# Patient Record
Sex: MALE | Race: WHITE
[De-identification: names, ages, dates, MRNs, and addresses within clinical notes are randomized per-mention and may not be internally consistent; named-entity substitution may affect disease eponyms.]

---

## 2022-06-23 ENCOUNTER — HOSPITAL ENCOUNTER (EMERGENCY)
Dept: HOSPITAL 33 - ED | Age: 37
Discharge: HOME | End: 2022-06-23
Payer: COMMERCIAL

## 2022-06-23 VITALS — SYSTOLIC BLOOD PRESSURE: 149 MMHG | HEART RATE: 88 BPM | DIASTOLIC BLOOD PRESSURE: 104 MMHG

## 2022-06-23 DIAGNOSIS — N49.2: Primary | ICD-10-CM

## 2022-06-23 DIAGNOSIS — Z28.310: ICD-10-CM

## 2022-06-23 DIAGNOSIS — N50.82: ICD-10-CM

## 2022-06-23 DIAGNOSIS — Z79.891: ICD-10-CM

## 2022-06-23 LAB
ALBUMIN SERPL-MCNC: 4.2 G/DL (ref 3.5–5)
ALP SERPL-CCNC: 84 U/L (ref 38–126)
ALT SERPL-CCNC: 102 U/L (ref 0–50)
ANION GAP SERPL CALC-SCNC: 14.1 MEQ/L (ref 5–15)
AST SERPL QL: 45 U/L (ref 17–59)
BASOPHILS # BLD AUTO: 0.02 X10^3/UL (ref 0–0.4)
BILIRUB BLD-MCNC: 1.3 MG/DL (ref 0.2–1.3)
BUN SERPL-MCNC: 14 MG/DL (ref 9–20)
CALCIUM SPEC-MCNC: 9.4 MG/DL (ref 8.4–10.2)
CHLORIDE SERPL-SCNC: 104 MMOL/L (ref 98–107)
CO2 SERPL-SCNC: 24 MMOL/L (ref 22–30)
CREAT SERPL-MCNC: 1 MG/DL (ref 0.66–1.25)
EOSINOPHIL # BLD AUTO: 0.05 X10^3/UL (ref 0–0.5)
GFR SERPLBLD BASED ON 1.73 SQ M-ARVRAT: > 60 ML/MIN
GLUCOSE SERPL-MCNC: 105 MG/DL (ref 74–106)
HCT VFR BLD AUTO: 49.5 % (ref 42–50)
HGB BLD-MCNC: 17.1 G/DL (ref 12.5–18)
LYMPHOCYTES # SPEC AUTO: 0.66 X10^3/UL (ref 1–4.6)
MCH RBC QN AUTO: 31.5 PG (ref 26–32)
MCHC RBC AUTO-ENTMCNC: 34.5 G/DL (ref 32–36)
MONOCYTES # BLD AUTO: 0.7 X10^3/UL (ref 0–1.3)
PLATELET # BLD AUTO: 190 X10^3/UL (ref 150–450)
POTASSIUM SERPLBLD-SCNC: 4 MMOL/L (ref 3.5–5.1)
PROT SERPL-MCNC: 7.1 G/DL (ref 6.3–8.2)
RBC # BLD AUTO: 5.42 X10^6/UL (ref 4.1–5.6)
SODIUM SERPL-SCNC: 139 MMOL/L (ref 137–145)
WBC # BLD AUTO: 10.9 X10^3/UL (ref 4–10.5)

## 2022-06-23 PROCEDURE — 96375 TX/PRO/DX INJ NEW DRUG ADDON: CPT

## 2022-06-23 PROCEDURE — 83605 ASSAY OF LACTIC ACID: CPT

## 2022-06-23 PROCEDURE — 96374 THER/PROPH/DIAG INJ IV PUSH: CPT

## 2022-06-23 PROCEDURE — 72192 CT PELVIS W/O DYE: CPT

## 2022-06-23 PROCEDURE — 36415 COLL VENOUS BLD VENIPUNCTURE: CPT

## 2022-06-23 PROCEDURE — 80053 COMPREHEN METABOLIC PANEL: CPT

## 2022-06-23 PROCEDURE — 85025 COMPLETE CBC W/AUTO DIFF WBC: CPT

## 2022-06-23 PROCEDURE — 87070 CULTURE OTHR SPECIMN AEROBIC: CPT

## 2022-06-23 PROCEDURE — 96365 THER/PROPH/DIAG IV INF INIT: CPT

## 2022-06-23 PROCEDURE — 99284 EMERGENCY DEPT VISIT MOD MDM: CPT

## 2022-06-23 NOTE — ERPHSYRPT
- History of Present Illness


Time Seen by Provider: 06/23/22 10:09


Source: patient


Exam Limitations: no limitations


Patient Subjective Stated Complaint: pt here for abscess to rectal area for 3 

days now, pt has hx of this and has to have them i/d


Triage Nursing Assessment: pt alert, resp easy, face mask in place, unable to 

set down, no drainage


Physician History: 





36 years old male with history of multiple scrotal abscesses in the past needing

I&D presented in the ER with 2 days of increasing pain and swelling at posterior

end of scrotum.  Patient reports sharp shooting/stabbing severe pain radiation 

to perirectal area.  No fever or chills reported.  Denies any testicular pain.








Timing/Duration: day(s) (2), gradual onset, worse


Activites at Onset: rest


Quality: sharpness, stabbing


Onset Location: scrotal


Severity of Pain-Max: severe


Severity of Pain-Current: severe


Modifying Factors: Worsens With: movement, palpation


Associated Symptoms: swelling


Prior abdominal problems: none


Sexual intercourse history: non-contributory


Allergies/Adverse Reactions: 








Sulfa (Sulfonamide Antibiotics) Allergy (Verified 06/23/22 10:17)


   





Hx Tetanus, Diphtheria Vaccination/Date Given: No


Hx Influenza Vaccination/Date Given: No


Hx Pneumococcal Vaccination/Date Given: No


Immunizations Up to Date: Yes





Travel Risk





- International Travel


Have you traveled outside of the country in past 3 weeks: No





- Coronavirus Screening


Are you exhibiting any of the following symptoms?: No





- Vaccine Status


Have you recieved a Covid-19 vaccination: No





- Past Surgical History


Past Surgical History: Yes


Other Surgical History: abscess





- Social History


Smoking Status: Never smoker


Exposure to second hand smoke: No


Drug Use: none


Patient Lives Alone: No





- Review of Systems


Constitutional: No Symptoms


Ears, Nose, & Throat: No Symptoms


Respiratory: No Symptoms


Cardiac: No Symptoms


Abdominal/Gastrointestinal: No Symptoms


Genitourinary Symptoms: No Frequency, No Urinary Retention


Musculoskeletal: No Symptoms


Skin: No Symptoms


Neurological: No Symptoms


Psychological: No Symptoms


Endocrine: No Symptoms


Hematologic/Lymphatic: No Symptoms


Immunological/Allergic: No Symptoms





- Nursing Vital Signs


Nursing Vital Signs: 


                               Initial Vital Signs











Temperature  98.8 F   06/23/22 10:06


 


Pulse Rate  98 H  06/23/22 10:06


 


Respiratory Rate  18   06/23/22 10:06


 


Blood Pressure  156/114   06/23/22 10:06


 


O2 Sat by Pulse Oximetry  98   06/23/22 10:06








                                   Pain Scale











Pain Intensity                 5

















- Physical Exam


General Appearance: no apparent distress, alert


Eye Exam: PERRL/EOMI


Ears, Nose, Throat Exam: normal ENT inspection


Neck Exam: normal inspection, full range of motion


Respiratory Exam: normal breath sounds, lungs clear


Cardiovascular Exam: regular rate/rhythm, normal heart sounds


Gastrointestinal/Abdomen Exam: soft, normal bowel sounds, No tenderness


Rectal Exam: deferred


Male Genital Exam: normal genitalia, scrotum tenderness (R), scrotum tenderness 

(L) (Posterior scrotum 2 x 3 cm closer to cranium area of induration, tenderness

with no fluctuation.  No perirectal area swelling), circumcised, No epididymal 

tenderness


Back Exam: normal inspection


Extremity Exam: normal inspection, normal range of motion


Neurologic Exam: alert, oriented x 3, cooperative


Skin Exam: normal color


**SpO2 Interpretation**: normal


SpO2: 95


O2 Delivery: Room Air


Ordered Tests: 


Medication Summary














Discontinued Medications














Generic Name Dose Route Start Last Admin





  Trade Name Jameson  PRN Reason Stop Dose Admin


 


Hydromorphone HCl  1 mg  06/23/22 10:21  06/23/22 10:55





  Hydromorphone 1 Mg/1ml Inj*** 1 Mg/Ml Syringe  IV  06/23/22 10:22  1 mg





  STAT ONE   Administration


 


Hydromorphone HCl  Confirm  06/23/22 10:54 





  Hydromorphone 1 Mg/1ml Inj*** 1 Mg/Ml Syringe  Administered  06/23/22 10:55 





  Dose  





  1 mg  





  .ROUTE  





  .STK-MED ONE  


 


Sodium Chloride  1,000 mls @ 999 mls/hr  06/23/22 10:21  06/23/22 12:25





  Sodium Chloride 0.9% 1000 Ml  IV  06/23/22 11:21  Infused





  .Q1H1M STA   Infusion


 


Sodium Chloride  Confirm  06/23/22 10:54 





  Sodium Chloride 0.9% 1000 Ml  Administered  06/23/22 10:55 





  Dose  





  1,000 mls @ ud  





  .ROUTE  





  .STK-MED ONE  


 


Clindamycin HCl/Dextrose  900 mg in 50 mls @ 100 mls/hr  06/23/22 12:24  

06/23/22 13:08





  Clindamycin-D5w 900 Mg/50 Ml***  IV  06/23/22 12:53  Infused





  STAT STA   Infusion


 


Clindamycin HCl/Dextrose  Confirm  06/23/22 12:26 





  Clindamycin-D5w 900 Mg/50 Ml***  Administered  06/23/22 12:27 





  Dose  





  900 mg in 50 mls @ ud  





  IV  





  .STK-MED ONE  


 


Ondansetron HCl  4 mg  06/23/22 10:21  06/23/22 10:55





  Ondansetron Hcl 4 Mg/2 Ml Vial  IV  06/23/22 10:22  4 mg





  STAT ONE   Administration


 


Ondansetron HCl  Confirm  06/23/22 10:54 





  Ondansetron Hcl 4 Mg/2 Ml Vial  Administered  06/23/22 10:55 





  Dose  





  4 mg  





  .ROUTE  





  .STK-MED ONE  











Lab/Rad Data: 


                           Laboratory Result Diagrams





                                 06/23/22 10:45 





                                 06/23/22 10:45 





                               Laboratory Results











  06/23/22 06/23/22 06/23/22 Range/Units





  10:45 10:45 10:21 


 


WBC   10.9 H   (4.0-10.5)  x10^3/uL


 


RBC   5.42   (4.1-5.6)  x10^6/uL


 


Hgb   17.1   (12.5-18.0)  g/dL


 


Hct   49.5   (42-50)  %


 


MCV   91.3   ()  fL


 


MCH   31.5   (26-32)  pg


 


MCHC   34.5   (32-36)  g/dL


 


RDW   12.1   (11.5-14.0)  %


 


Plt Count   190   (150-450)  x10^3/uL


 


MPV   11.0   (7.5-11.0)  fL


 


Gran %   86.7 H   (36.0-66.0)  %


 


Immature Gran % (Auto)   0.2   (0.00-0.4)  %


 


Nucleat RBC Rel Count   0.0   (0.00-0.1)  %


 


Eos # (Auto)   0.05   (0-0.5)  x10^3/uL


 


Immature Gran # (Auto)   0.02   (0.00-0.03)  x10^3u/L


 


Absolute Lymphs (auto)   0.66 L   (1.0-4.6)  x10^3/uL


 


Absolute Monos (auto)   0.70   (0.0-1.3)  x10^3/uL


 


Absolute Nucleated RBC   0.00   (0.00-0.01)  x10^3u/L


 


Lymphocytes %   6.0 L   (24.0-44.0)  %


 


Monocytes %   6.4   (0.0-12.0)  %


 


Eosinophils %   0.5   (0.00-5.0)  %


 


Basophils %   0.2   (0.0-0.4)  %


 


Absolute Granulocytes   9.47 H   (1.4-6.9)  x10^3/uL


 


Basophils #   0.02   (0-0.4)  x10^3/uL


 


Sodium  139    (137-145)  mmol/L


 


Potassium  4.0    (3.5-5.1)  mmol/L


 


Chloride  104    ()  mmol/L


 


Carbon Dioxide  24    (22-30)  mmol/L


 


Anion Gap  14.1    (5-15)  MEQ/L


 


BUN  14    (9-20)  mg/dL


 


Creatinine  1.00    (0.66-1.25)  mg/dL


 


Estimated GFR  > 60.0    ML/MIN


 


Glucose  105    ()  mg/dL


 


Lactic Acid    1.2  (0.4-2.0)  


 


Calcium  9.4    (8.4-10.2)  mg/dL


 


Total Bilirubin  1.30    (0.2-1.3)  mg/dL


 


AST  45    (17-59)  U/L


 


ALT  102 H    (0-50)  U/L


 


Alkaline Phosphatase  84    ()  U/L


 


Serum Total Protein  7.1    (6.3-8.2)  g/dL


 


Albumin  4.2    (3.5-5.0)  g/dL














- Progress


Progress: improved, pain not gone completely


Progress Note: 





06/23/22 12:55


Is given pain medication for symptomatic relief.  Has a white count of 10.9, 

grossly unremarkable chemistries.  No UTI.  Obtain CT pelvis which showed 

posterior scrotal swelling but no definitive abscess or any signs symptoms 

suggesting Ayesha's gangrene.  Given clindamycin in ER.  I have tried to have 

him follow-up with his primary care but office is closed tomorrow.  Area is 

encircled and recommended ER follow-up tomorrow for reevaluation to see if it 

needs I&D.  Also discussed signs symptoms of worsening needing return to ER 

which he seems understanding.  Stable for discharge








Counseled pt/family regarding: lab results, diagnosis, need for follow-up, rad 

results





- Departure


Departure Disposition: Home


Clinical Impression: 


 Cellulitis of scrotum





Condition: Stable


Critical Care Time: No


Referrals: 


JOYCE MILES [Primary Care Provider] - Follow Up with PCP/3 days


Instructions:  [facility] (DC)


Additional Instructions: 


Return to ER for reevaluation tomorrow.  Return our ER if having increased 

swelling with excruciating pain, fever or chills or difficulty urination etc.  

Take pain medications as needed.  Use warm compresses/sitz bath.


Prescriptions: 


clindamycin HCL [Clindamycin HCl] 300 mg PO QID 7 Days #28 cap


Oxycodone HCl/Acetaminophen [Oxycodone-Acetaminophn 7.5-325] 1 each PO Q6H PRN 3

Days #12 tablet MDD 4


 PRN Reason: Pain

## 2022-06-23 NOTE — XRAY
Indication: "Abscess" between rectum and right scrotum for 7 years.



Multiple contiguous axial images obtained through the pelvis without contrast.



Comparison: None



Posterior scrotum demonstrates midline wall thickening without focal walled

off fluid collection or subcutaneous emphysema.  No pathologic pelvic or

inguinal lymphadenopathy.  Visualized bowel loops nonobstructed with normal

appendix.  No free fluid/air.  Remaining visualized urinary bladder,

artery/veins, and osseous structures unremarkable.



Impression: Nonspecific posterior scrotum wall thickening.  Remaining CT

pelvis without contrast exam is negative.

## 2022-06-24 ENCOUNTER — HOSPITAL ENCOUNTER (EMERGENCY)
Dept: HOSPITAL 33 - ED | Age: 37
Discharge: HOME | End: 2022-06-24
Payer: COMMERCIAL

## 2022-06-24 VITALS — OXYGEN SATURATION: 98 %

## 2022-06-24 VITALS — HEART RATE: 90 BPM | SYSTOLIC BLOOD PRESSURE: 139 MMHG | DIASTOLIC BLOOD PRESSURE: 97 MMHG

## 2022-06-24 DIAGNOSIS — N50.82: ICD-10-CM

## 2022-06-24 DIAGNOSIS — Z28.310: ICD-10-CM

## 2022-06-24 DIAGNOSIS — L72.3: Primary | ICD-10-CM

## 2022-06-24 PROCEDURE — 10060 I&D ABSCESS SIMPLE/SINGLE: CPT

## 2022-06-24 PROCEDURE — 99284 EMERGENCY DEPT VISIT MOD MDM: CPT

## 2022-06-24 PROCEDURE — 96372 THER/PROPH/DIAG INJ SC/IM: CPT

## 2022-06-24 NOTE — ERPHSYRPT
- History of Present Illness


Time Seen by Provider: 06/24/22 10:04


Source: patient


Exam Limitations: no limitations


Physician History: 





This is a 36-year-old white male who was seen here on 6/23/2022 with a scrotal 

mass/?  Abscess.  A CAT scan was performed on 6/23/2022 and there is no evidence

of any Ayesha's gangrene or abscess.  However there was an area of induration 

and tenderness present.  Patient has no history of diabetes.  Patient has had at

least 10 episodes of recurrent abscesses present.  Patient was placed on 

oxycodone and clindamycin.  Patient had a white count yesterday of 10.9.  He has

been afebrile.  Today, he feels as though the area is more tender and slightly 

increased in size.  Patient was instructed to follow-up in the emergency 

department today for reevaluation.  His PCP is out of the office.


Timing/Duration: day(s) (2)


Quality: burning, painful


Severity: mild (Moderate)


Location: other (Scrotal)


Possible Causes: other (Sebaceous cyst)


Associated Symptoms: denies symptoms


Allergies/Adverse Reactions: 








Sulfa (Sulfonamide Antibiotics) Allergy (Verified 06/23/22 10:17)


   





Hx Tetanus, Diphtheria Vaccination/Date Given: No


Hx Influenza Vaccination/Date Given: No


Hx Pneumococcal Vaccination/Date Given: No





Travel Risk





- International Travel


Have you traveled outside of the country in past 3 weeks: No





- Coronavirus Screening


Are you exhibiting any of the following symptoms?: No


Close contact with a COVID-19 positive Pt in past 14-21 Days: No





- Vaccine Status


Have you recieved a Covid-19 vaccination: No





- Review of Systems


Constitutional: No Symptoms


Eyes: No Symptoms


Ears, Nose, & Throat: No Symptoms


Respiratory: No Symptoms


Cardiac: No Symptoms


Abdominal/Gastrointestinal: No Symptoms


Genitourinary Symptoms: Other (Scrotal mass, tender)


Musculoskeletal: No Symptoms


Skin: Induration (Scrotal mass)


Neurological: No Symptoms


Psychological: No Symptoms


Endocrine: No Symptoms


Hematologic/Lymphatic: No Symptoms


Immunological/Allergic: No Symptoms


All Other Systems: Reviewed and Negative





- Past Medical History


Pertinent Past Medical History: No





- Past Surgical History


Past Surgical History: Yes


Other Surgical History: abscess





- Social History


Smoking Status: Never smoker


Exposure to second hand smoke: No


Drug Use: none


Patient Lives Alone: No





- Nursing Vital Signs


Nursing Vital Signs: 


                               Initial Vital Signs











Temperature  99.0 F   06/24/22 10:05


 


Pulse Rate  104 H  06/24/22 10:05


 


Respiratory Rate  20   06/24/22 10:05


 


Blood Pressure  141/83   06/24/22 10:05


 


O2 Sat by Pulse Oximetry  98   06/24/22 10:05








                                   Pain Scale











Pain Intensity                 6

















- Physical Exam


General Appearance: no apparent distress, alert, anxiety


Eye Exam: PERRL/EOMI, eyes nml inspection


Ears, Nose, Throat Exam: normal ENT inspection, moist mucous membranes


Neck Exam: normal inspection, non-tender, supple, full range of motion


Respiratory Exam: No chest tenderness, No respiratory distress, No airway intact


Gastrointestinal/Abdomen Exam: No tenderness


Male Genitalia Exam: other (Scrotal mass, tender,?  Fluctuance)


Rectal Exam: not done


Back Exam: normal inspection, normal range of motion, No CVA tenderness


Extremity Exam: normal inspection, normal range of motion, pelvis stable


Neurologic Exam: alert, oriented x 3, cooperative, CNs II-XII nml as tested, 

normal mood/affect, nml cerebellar function, nml station & gait, sensation nml


Skin Exam: normal color, warm, dry


Lymphatic Exam: No adenopathy


**SpO2 Interpretation**: normal


O2 Delivery: Room Air





Procedures





- Incision and Drainage


Time of Procedure: 10:30


Site: Scrotum


Blade Size: 11


I & D Procedure: betadine prep


Results: small amount pus


Progress: 





Patient taught the procedure well.  Jacqueline-pad was placed in the area.





- Course


Nursing assessment & vital signs reviewed: Yes


Ordered Tests: 


Medication Summary














Discontinued Medications














Generic Name Dose Route Start Last Admin





  Trade Name Jameson  PRN Reason Stop Dose Admin


 


Doxycycline Hyclate  100 mg  06/24/22 10:35 





  Doxycycline Hyclate 100 Mg Tablet  PO  06/24/22 10:36 





  STAT ONE  


 


Hydromorphone HCl  1 mg  06/24/22 10:35 





  Hydromorphone 1 Mg/1ml Inj*** 1 Mg/Ml Syringe  IM  06/24/22 10:36 





  STAT ONE  


 


Ondansetron HCl  4 mg  06/24/22 10:36 





  Zofran 4 Mg/Udtablet Orally Disintegrating  PO  06/24/22 10:37 





  STAT ONE  














- Progress


Progress: unchanged


Counseled pt/family regarding: diagnosis, need for follow-up





- Departure


Departure Disposition: Home


Clinical Impression: 


 Infected sebaceous cyst





Condition: Stable


Critical Care Time: No


Referrals: 


JOYCE MILES [Primary Care Provider] - Follow up/PCP as directed


Additional Instructions: 


Sitz bath with warm soapy water or warm Epson salt twice a day.  Take your 

antibiotics as prescribed.  If your symptoms worsen, proceed to either [facility] or [facility] in [facility] for evaluation by 

urologist.  Keep your appointment with urology that is made for you.  Cover the 

wound site with Mukesh pad as needed.  Do not use ointments lotions or creams to 

the site.


Prescriptions: 


Doxycycline Hyclate 100 mg*** [Vibramycin 100 MG***] 100 mg PO BID #14 tab

## 2022-06-26 VITALS — OXYGEN SATURATION: 95 %
